# Patient Record
(demographics unavailable — no encounter records)

---

## 2024-11-12 NOTE — HISTORY OF PRESENT ILLNESS
[No Personal or Family History of Easy Bruising, Bleeding, or Issues with General Anesthesia] : No Personal or Family History of easy bruising, bleeding, or issues with general anesthesia [de-identified] : 6 year old boy presents for initial evaluation for vocal hoarseness that has been present since he was little but has gotten worse in the past 6 months.  Normally his voice gets bad at the end of the day and he reports some vocal strain that causes some pain.  Denies complete loss of voice or SOB while speaking.  Eating/drinking without any difficulty. No history of COVID or RSV.  No throat/tonsil infections.  No otalgia, otorrhea, recent fevers or ear infections.  No parental concerns with hearing. No nasal congestion or snoring.

## 2024-11-12 NOTE — BIRTH HISTORY
[At ___ Weeks Gestation] : at [unfilled] weeks gestation [ Section] : by  section [None] : No maternal complications [Passed] : passed [de-identified] : No NICU stay, never intubated

## 2024-11-12 NOTE — HISTORY OF PRESENT ILLNESS
[No Personal or Family History of Easy Bruising, Bleeding, or Issues with General Anesthesia] : No Personal or Family History of easy bruising, bleeding, or issues with general anesthesia [de-identified] : 6 year old boy presents for initial evaluation for vocal hoarseness that has been present since he was little but has gotten worse in the past 6 months.  Normally his voice gets bad at the end of the day and he reports some vocal strain that causes some pain.  Denies complete loss of voice or SOB while speaking.  Eating/drinking without any difficulty. No history of COVID or RSV.  No throat/tonsil infections.  No otalgia, otorrhea, recent fevers or ear infections.  No parental concerns with hearing. No nasal congestion or snoring.

## 2024-11-12 NOTE — BIRTH HISTORY
[At ___ Weeks Gestation] : at [unfilled] weeks gestation [ Section] : by  section [None] : No maternal complications [Passed] : passed [de-identified] : No NICU stay, never intubated

## 2024-11-12 NOTE — REASON FOR VISIT
[Initial Evaluation] : an initial evaluation for [Mother] : mother [FreeTextEntry2] : vocal hoarseness